# Patient Record
Sex: FEMALE | Race: WHITE
[De-identification: names, ages, dates, MRNs, and addresses within clinical notes are randomized per-mention and may not be internally consistent; named-entity substitution may affect disease eponyms.]

---

## 2017-10-06 ENCOUNTER — FORM ENCOUNTER (OUTPATIENT)
Age: 77
End: 2017-10-06

## 2019-07-29 ENCOUNTER — FORM ENCOUNTER (OUTPATIENT)
Age: 79
End: 2019-07-29

## 2019-08-08 ENCOUNTER — FORM ENCOUNTER (OUTPATIENT)
Age: 79
End: 2019-08-08

## 2019-08-12 ENCOUNTER — LABORATORY RESULT (OUTPATIENT)
Age: 79
End: 2019-08-12

## 2019-08-12 ENCOUNTER — APPOINTMENT (OUTPATIENT)
Dept: NEPHROLOGY | Facility: CLINIC | Age: 79
End: 2019-08-12
Payer: MEDICARE

## 2019-08-12 VITALS — HEART RATE: 96 BPM | SYSTOLIC BLOOD PRESSURE: 160 MMHG | DIASTOLIC BLOOD PRESSURE: 80 MMHG

## 2019-08-12 VITALS — WEIGHT: 173.25 LBS | BODY MASS INDEX: 34.01 KG/M2 | OXYGEN SATURATION: 98 % | HEART RATE: 99 BPM | HEIGHT: 60 IN

## 2019-08-12 VITALS — HEART RATE: 96 BPM | SYSTOLIC BLOOD PRESSURE: 144 MMHG | DIASTOLIC BLOOD PRESSURE: 60 MMHG

## 2019-08-12 VITALS — SYSTOLIC BLOOD PRESSURE: 152 MMHG | DIASTOLIC BLOOD PRESSURE: 80 MMHG

## 2019-08-12 DIAGNOSIS — N39.41 URGE INCONTINENCE: ICD-10-CM

## 2019-08-12 DIAGNOSIS — R39.15 URGENCY OF URINATION: ICD-10-CM

## 2019-08-12 DIAGNOSIS — R60.0 LOCALIZED EDEMA: ICD-10-CM

## 2019-08-12 DIAGNOSIS — E07.9 DISORDER OF THYROID, UNSPECIFIED: ICD-10-CM

## 2019-08-12 DIAGNOSIS — J45.909 UNSPECIFIED ASTHMA, UNCOMPLICATED: ICD-10-CM

## 2019-08-12 DIAGNOSIS — M15.9 POLYOSTEOARTHRITIS, UNSPECIFIED: ICD-10-CM

## 2019-08-12 DIAGNOSIS — R31.29 OTHER MICROSCOPIC HEMATURIA: ICD-10-CM

## 2019-08-12 DIAGNOSIS — E66.9 OBESITY, UNSPECIFIED: ICD-10-CM

## 2019-08-12 DIAGNOSIS — N28.89 OTHER SPECIFIED DISORDERS OF KIDNEY AND URETER: ICD-10-CM

## 2019-08-12 DIAGNOSIS — R73.02 IMPAIRED GLUCOSE TOLERANCE (ORAL): ICD-10-CM

## 2019-08-12 DIAGNOSIS — R35.0 FREQUENCY OF MICTURITION: ICD-10-CM

## 2019-08-12 DIAGNOSIS — I10 ESSENTIAL (PRIMARY) HYPERTENSION: ICD-10-CM

## 2019-08-12 DIAGNOSIS — E78.00 PURE HYPERCHOLESTEROLEMIA, UNSPECIFIED: ICD-10-CM

## 2019-08-12 PROCEDURE — 93000 ELECTROCARDIOGRAM COMPLETE: CPT

## 2019-08-12 PROCEDURE — 36415 COLL VENOUS BLD VENIPUNCTURE: CPT

## 2019-08-12 PROCEDURE — 99205 OFFICE O/P NEW HI 60 MIN: CPT | Mod: 25

## 2019-08-12 NOTE — HISTORY OF PRESENT ILLNESS
[FreeTextEntry1] : The patient is a 79 year old female presenting for initial evaluation of abnormal mammogram and renal sonogram.\par Referred by [facility].\par \par Chief Complaint:\par - Renal sonogram reportedly per Dr. Metcalf (cardiologist and PCP) who reportedly found something that the patient cannot remember. Sonogram reportedly performed in Dr. Metcalf's office.\par - Recent mammogram from [facility] reportedly found reported abnormalities bilaterally and patient has reportedly been referred to have biopsies with Dr. Mckeon.\par - She reports swollen ankles, denies any pain when walking.\par \par Past Medical History:\par - h/o HTN\par - h/o overactive bladder, patient has regular urology follow-ups. She reports urinary incontinence.\par - h/o carpal tunnel to be operated on in September with Dr. Thayer.\par - h/o thyroid nodules with Dr. Caruso at [facility]\par - h/o DM reportedly resulting from steroid therapy for asthma. Pulmonologist (Dr. Dumont) has reportedly taken her off steroids. DM followed by Dr. Kimball.\par - H/o osteoporosis, reports recent bone density.\par - H/o osteoarthritis\par - h/o GERD and is followed by GI. Has had colonoscopy and capsule endoscopy.\par - Has recently had cardiac workups at [facility].\par - Weight at 173lbs, 5' tall, reportedly stable\par - Denies any dermatologic, blood, lymph, head, ear, eyes, nose, cardiac, or neurologic problems.\par - She is unsure if she snores. She denies any sleep problems and midday somnolence. \par \par Past Surgical History/ Hospitalizations:\par - Operation for "twisted intestines"\par - S/p cholecystectomy \par - S/p appendectomy\par - h/o orthopedic surgeries\par \par Family History:\par - Patient's mother  from breast CA, father  from bladder CA and was a smoker, sister has brain CA.\par - Patient has two adopted children and has several grandchildren.\par \par Social History:\par - Patient lives alone in [place],  is . She is retired from work in a dental office.\par - No smoking, no EtOH, no pets, no international trips.\par \par Current Medications: pantoprazole 40mg QD, simvastatin 20mg QD, valsartan-HCTZ 150-12.5 mg QD, super B complex, calcium 600 and D3 QD, Centrum silver QD, Prolia injection Q6M (per. Dr. Kimball, endocrinologist), Symbicort inhaler 160/45, levalbuterol prn, furosemide 20mg prn, Tradjenta 5mg QD, B12 5000, Montelukast 10mg QD.\par \par Allergies: Sulfa, latex, "pain medications" result in anaphylaxis except Demerol; acetaminophen reportedly ineffective and ibuprofen cause nausea.

## 2019-08-12 NOTE — ASSESSMENT
[FreeTextEntry1] : Assessment: Patient is an 80 y/o female presenting today for initial evaluation of an abnormal mammogram and abnormal renal sonogram. She has PMH of HTN, overactive bladder, incontinence, carpal tunnel, thyroid nodules, DM, asthma, GERD, osteoporosis, osteoarthritis, s/p cholecystectomy, appendectomy, and orthopedic surgeries.\par \par Plan:\par 1) Blood pressure in office today is hypertensive. EKG taken in office shows  a resting heart rate of 95 and was otherwise unremarkable.\par 2) Referred for neuro urologic evaluation.\par 3) Will refer patient to Dr. Ortiz for evaluation of her thyroid.\par 4) Patient will send me the data from her mammogram, cardiac workups, GI workups, bone density, and thyroid sonogram. Instructed patient to have a renal sonogram with Doppler's and a renal scan.\par 5) Renal mass: Initial report was based on office exam by primary doctor, but will obtain a more formal assessment with radiographic sonogram.\par \par Changes to Medications: none\par \par EKG taken today, results above. Labs were drawn and patient will return in 2-3 weeks for a follow-up appointment.

## 2019-08-12 NOTE — END OF VISIT
[FreeTextEntry3] : All medical record entries made by the Scribe were at my, Dr. Waylon Millan, direction and personally dictated by me on 08/12/2019. I have reviewed the chart and agree that the record accurately reflects my personal performance of the history, physical exam, assessment and plan. I have also personally directed, reviewed, and agreed with the chart.

## 2019-08-12 NOTE — PHYSICAL EXAM
[General Appearance - Alert] : alert [General Appearance - In No Acute Distress] : in no acute distress [Sclera] : the sclera and conjunctiva were normal [PERRL With Normal Accommodation] : pupils were equal in size, round, and reactive to light [Extraocular Movements] : extraocular movements were intact [Outer Ear] : the ears and nose were normal in appearance [Oropharynx] : the oropharynx was normal [Neck Appearance] : the appearance of the neck was normal [Neck Cervical Mass (___cm)] : no neck mass was observed [Jugular Venous Distention Increased] : there was no jugular-venous distention [Thyroid Diffuse Enlargement] : the thyroid was not enlarged [Thyroid Nodule] : there were no palpable thyroid nodules [Auscultation Breath Sounds / Voice Sounds] : lungs were clear to auscultation bilaterally [Heart Sounds Gallop] : no gallops [Heart Sounds] : normal S1 and S2 [Murmurs] : no murmurs [Heart Sounds Pericardial Friction Rub] : no pericardial rub [Bowel Sounds] : normal bowel sounds [Abdomen Soft] : soft [Abdomen Tenderness] : non-tender [Abdomen Mass (___ Cm)] : no abdominal mass palpated [No Spinal Tenderness] : no spinal tenderness [No CVA Tenderness] : no ~M costovertebral angle tenderness [Abnormal Walk] : normal gait [Nail Clubbing] : no clubbing  or cyanosis of the fingernails [Motor Tone] : muscle strength and tone were normal [Musculoskeletal - Swelling] : no joint swelling seen [Skin Color & Pigmentation] : normal skin color and pigmentation [] : no rash [Skin Turgor] : normal skin turgor [Cranial Nerves] : cranial nerves 2-12 were intact [No Focal Deficits] : no focal deficits [Motor Exam] : the motor exam was normal [Oriented To Time, Place, And Person] : oriented to person, place, and time [Impaired Insight] : insight and judgment were intact [Affect] : the affect was normal [FreeTextEntry1] : 2+ posterior tibial pulses.

## 2019-08-12 NOTE — ADDENDUM
[FreeTextEntry1] :  Documented by John Paul Pedraza acting as a scribe for Dr. Waylon Millan on 08/12/2019.

## 2019-08-13 ENCOUNTER — FORM ENCOUNTER (OUTPATIENT)
Age: 79
End: 2019-08-13

## 2019-08-18 LAB
24R-OH-CALCIDIOL SERPL-MCNC: 42.6 PG/ML
25(OH)D3 SERPL-MCNC: 64.7 NG/ML
ALBUMIN MFR SERPL ELPH: 59.7 %
ALBUMIN SERPL ELPH-MCNC: 4.7 G/DL
ALBUMIN SERPL-MCNC: 4.2 G/DL
ALBUMIN/GLOB SERPL: 1.5 RATIO
ALDOSTERONE SERUM: 9.4 NG/DL
ALP BLD-CCNC: 104 U/L
ALP BONE SERPL-MCNC: 17 MCG/L
ALPHA1 GLOB MFR SERPL ELPH: 4.7 %
ALPHA1 GLOB SERPL ELPH-MCNC: 0.3 G/DL
ALPHA2 GLOB MFR SERPL ELPH: 11.8 %
ALPHA2 GLOB SERPL ELPH-MCNC: 0.8 G/DL
ALT SERPL-CCNC: 12 U/L
ANA SER IF-ACNC: NEGATIVE
ANION GAP SERPL CALC-SCNC: 25 MMOL/L
APPEARANCE: CLEAR
APTT BLD: 34 SEC
AST SERPL-CCNC: 27 U/L
B-GLOBULIN MFR SERPL ELPH: 12.8 %
B-GLOBULIN SERPL ELPH-MCNC: 0.9 G/DL
BACTERIA: ABNORMAL
BASOPHILS # BLD AUTO: 0.05 K/UL
BASOPHILS NFR BLD AUTO: 0.6 %
BILIRUB SERPL-MCNC: 0.2 MG/DL
BILIRUBIN URINE: NEGATIVE
BLOOD URINE: NEGATIVE
BUN SERPL-MCNC: 25 MG/DL
C3 SERPL-MCNC: 156 MG/DL
C4 SERPL-MCNC: 34 MG/DL
CALCIUM SERPL-MCNC: 10.5 MG/DL
CALCIUM SERPL-MCNC: 10.5 MG/DL
CHLORIDE SERPL-SCNC: 106 MMOL/L
CHOLEST SERPL-MCNC: 184 MG/DL
CHOLEST/HDLC SERPL: 2.6 RATIO
CO2 SERPL-SCNC: 16 MMOL/L
COLLAGEN CTX SERPL-MCNC: 382 PG/ML
COLOR: ABNORMAL
CREAT SERPL-MCNC: 1.16 MG/DL
CREAT SPEC-SCNC: 113 MG/DL
CREAT SPEC-SCNC: 113 MG/DL
CREAT/PROT UR: 0.1 RATIO
CRP SERPL-MCNC: 0.19 MG/DL
CYSTATIN C SERPL-MCNC: 1.58 MG/L
DEPRECATED D DIMER PPP IA-ACNC: 370 NG/ML DDU
DEPRECATED KAPPA LC FREE/LAMBDA SER: 0.79 RATIO
DEPRECATED KAPPA LC FREE/LAMBDA SER: 0.79 RATIO
EOSINOPHIL # BLD AUTO: 1.35 K/UL
EOSINOPHIL # BLD MANUAL: 1290 /UL
EOSINOPHIL NFR BLD AUTO: 16.9 %
ERYTHROCYTE [SEDIMENTATION RATE] IN BLOOD BY WESTERGREN METHOD: 46 MM/HR
ESTIMATED AVERAGE GLUCOSE: 126 MG/DL
FERRITIN SERPL-MCNC: 61 NG/ML
FOLATE SERPL-MCNC: >20 NG/ML
FSH SERPL-MCNC: 112 IU/L
GAMMA GLOB FLD ELPH-MCNC: 0.8 G/DL
GAMMA GLOB MFR SERPL ELPH: 11 %
GFR/BSA.PRED SERPLBLD CYS-BASED-ARV: 37 ML/MIN
GLUCOSE QUALITATIVE U: NEGATIVE
GLUCOSE SERPL-MCNC: 99 MG/DL
HBA1C MFR BLD HPLC: 6 %
HBV SURFACE AB SER QL: REACTIVE
HBV SURFACE AG SER QL: NONREACTIVE
HCT VFR BLD CALC: 40.1 %
HCV AB SER QL: NONREACTIVE
HCV S/CO RATIO: 0.08 S/CO
HCYS SERPL-MCNC: 13.1 UMOL/L
HDLC SERPL-MCNC: 72 MG/DL
HGB BLD-MCNC: 12.1 G/DL
HYALINE CASTS: 0 /LPF
IGA SER QL IEP: 257 MG/DL
IGF-1 INTERP: NORMAL
IGF-I BLD-MCNC: 151 NG/ML
IGG SER QL IEP: 730 MG/DL
IGM SER QL IEP: 78 MG/DL
IMM GRANULOCYTES NFR BLD AUTO: 0.4 %
INR PPP: 1.09 RATIO
INTERPRETATION SERPL IEP-IMP: NORMAL
IRON SATN MFR SERPL: 14 %
IRON SERPL-MCNC: 40 UG/DL
KAPPA LC CSF-MCNC: 2.49 MG/DL
KAPPA LC CSF-MCNC: 2.49 MG/DL
KAPPA LC SERPL-MCNC: 1.96 MG/DL
KAPPA LC SERPL-MCNC: 1.96 MG/DL
KETONES URINE: NORMAL
LDLC SERPL CALC-MCNC: 87 MG/DL
LEUKOCYTE ESTERASE URINE: NEGATIVE
LH SERPL-ACNC: 62.8 IU/L
LYMPHOCYTES # BLD AUTO: 1.51 K/UL
LYMPHOCYTES NFR BLD AUTO: 18.9 %
M PROTEIN SPEC IFE-MCNC: NORMAL
MAGNESIUM SERPL-MCNC: 2 MG/DL
MAN DIFF?: NORMAL
MCHC RBC-ENTMCNC: 29.4 PG
MCHC RBC-ENTMCNC: 30.2 GM/DL
MCV RBC AUTO: 97.6 FL
METHYLMALONATE SERPL-SCNC: 338 NMOL/L
MICROALBUMIN 24H UR DL<=1MG/L-MCNC: <1.2 MG/DL
MICROALBUMIN/CREAT 24H UR-RTO: NORMAL MG/G
MICROSCOPIC-UA: NORMAL
MONOCYTES # BLD AUTO: 0.54 K/UL
MONOCYTES NFR BLD AUTO: 6.8 %
MPO AB + PR3 PNL SER: NORMAL
NEUTROPHILS # BLD AUTO: 4.51 K/UL
NEUTROPHILS NFR BLD AUTO: 56.4 %
NITRITE URINE: POSITIVE
NT-PROBNP SERPL-MCNC: 159 PG/ML
PARATHYROID HORMONE INTACT: 25 PG/ML
PH URINE: 5
PHOSPHATE SERPL-MCNC: 3.7 MG/DL
PLATELET # BLD AUTO: 300 K/UL
POTASSIUM SERPL-SCNC: 4.5 MMOL/L
PROT SERPL-MCNC: 7 G/DL
PROT UR-MCNC: 8 MG/DL
PROTEIN URINE: NEGATIVE
PT BLD: 12.3 SEC
RBC # BLD: 4.11 M/UL
RBC # FLD: 12.6 %
RED BLOOD CELLS URINE: 1 /HPF
RENIN PLASMA: 56.8 PG/ML
RHEUMATOID FACT SER QL: <10 IU/ML
SODIUM SERPL-SCNC: 147 MMOL/L
SPECIFIC GRAVITY URINE: 1.02
SQUAMOUS EPITHELIAL CELLS: 1 /HPF
T3FREE SERPL-MCNC: 2.95 PG/ML
T3RU NFR SERPL: 1.1 TBI
T4 FREE SERPL-MCNC: 1.2 NG/DL
T4 SERPL-MCNC: 7.5 UG/DL
THYROGLOB AB SERPL-ACNC: <20 IU/ML
THYROPEROXIDASE AB SERPL IA-ACNC: <10 IU/ML
TIBC SERPL-MCNC: 294 UG/DL
TRIGL SERPL-MCNC: 123 MG/DL
TSH SERPL-ACNC: 0.76 UIU/ML
UIBC SERPL-MCNC: 254 UG/DL
URATE SERPL-MCNC: 7.1 MG/DL
UROBILINOGEN URINE: NORMAL
VIT B12 SERPL-MCNC: >2000 PG/ML
WBC # FLD AUTO: 7.99 K/UL
WHITE BLOOD CELLS URINE: 2 /HPF

## 2019-08-19 ENCOUNTER — FORM ENCOUNTER (OUTPATIENT)
Age: 79
End: 2019-08-19

## 2019-08-20 ENCOUNTER — FORM ENCOUNTER (OUTPATIENT)
Age: 79
End: 2019-08-20

## 2019-08-23 LAB
C1Q IMMUNE COMPLEX: <1.2 UG EQ/ML
C3D IMMUNE COMPLEXES: 8.6 UG EQ/ML

## 2019-09-05 ENCOUNTER — APPOINTMENT (OUTPATIENT)
Dept: NEPHROLOGY | Facility: CLINIC | Age: 79
End: 2019-09-05

## 2019-09-11 ENCOUNTER — FORM ENCOUNTER (OUTPATIENT)
Age: 79
End: 2019-09-11

## 2019-09-24 ENCOUNTER — FORM ENCOUNTER (OUTPATIENT)
Age: 79
End: 2019-09-24

## 2020-08-02 ENCOUNTER — FORM ENCOUNTER (OUTPATIENT)
Age: 80
End: 2020-08-02

## 2021-05-03 ENCOUNTER — APPOINTMENT (OUTPATIENT)
Dept: BREAST CENTER | Facility: CLINIC | Age: 81
End: 2021-05-03

## 2021-08-12 ENCOUNTER — APPOINTMENT (OUTPATIENT)
Dept: BREAST CENTER | Facility: CLINIC | Age: 81
End: 2021-08-12

## 2021-09-10 ENCOUNTER — APPOINTMENT (OUTPATIENT)
Dept: BREAST CENTER | Facility: CLINIC | Age: 81
End: 2021-09-10

## 2021-09-13 NOTE — REASON FOR VISIT
[Initial Evaluation] : an initial evaluation for [Father] : father [Initial Evaluation] : an initial evaluation

## 2024-04-15 ENCOUNTER — APPOINTMENT (OUTPATIENT)
Dept: ORTHOPEDIC SURGERY | Facility: CLINIC | Age: 84
End: 2024-04-15